# Patient Record
Sex: MALE | Race: WHITE | Employment: PART TIME | URBAN - METROPOLITAN AREA
[De-identification: names, ages, dates, MRNs, and addresses within clinical notes are randomized per-mention and may not be internally consistent; named-entity substitution may affect disease eponyms.]

---

## 2018-09-22 ENCOUNTER — APPOINTMENT (OUTPATIENT)
Dept: CT IMAGING | Age: 23
End: 2018-09-22

## 2018-09-22 ENCOUNTER — HOSPITAL ENCOUNTER (EMERGENCY)
Age: 23
Discharge: HOME | End: 2018-09-23
Attending: EMERGENCY MEDICINE

## 2018-09-22 DIAGNOSIS — S01.81XA FACIAL LACERATION, INITIAL ENCOUNTER: Primary | ICD-10-CM

## 2018-09-22 DIAGNOSIS — H11.31 CONJUNCTIVAL HEMORRHAGE OF RIGHT EYE: ICD-10-CM

## 2018-09-22 DIAGNOSIS — S09.90XA INJURY OF HEAD, INITIAL ENCOUNTER: ICD-10-CM

## 2018-09-22 PROCEDURE — 70486 CT MAXILLOFACIAL W/O DYE: CPT

## 2018-09-22 PROCEDURE — 70450 CT HEAD/BRAIN W/O DYE: CPT

## 2018-09-22 PROCEDURE — 72125 CT NECK SPINE W/O DYE: CPT

## 2018-09-22 PROCEDURE — 12013 RPR F/E/E/N/L/M 2.6-5.0 CM: CPT

## 2018-09-22 PROCEDURE — 99284 EMERGENCY DEPT VISIT MOD MDM: CPT

## 2018-09-22 RX ORDER — LIDOCAINE HYDROCHLORIDE AND EPINEPHRINE 10; 10 MG/ML; UG/ML
20 INJECTION, SOLUTION INFILTRATION; PERINEURAL ONCE
Status: DISCONTINUED | OUTPATIENT
Start: 2018-09-23 | End: 2018-09-23 | Stop reason: HOSPADM

## 2018-09-23 VITALS
HEART RATE: 92 BPM | DIASTOLIC BLOOD PRESSURE: 78 MMHG | RESPIRATION RATE: 20 BRPM | TEMPERATURE: 98.2 F | SYSTOLIC BLOOD PRESSURE: 124 MMHG | OXYGEN SATURATION: 97 %

## 2018-09-23 PROCEDURE — 6360000002 HC RX W HCPCS: Performed by: PHYSICIAN ASSISTANT

## 2018-09-23 PROCEDURE — 90715 TDAP VACCINE 7 YRS/> IM: CPT | Performed by: PHYSICIAN ASSISTANT

## 2018-09-23 PROCEDURE — 90471 IMMUNIZATION ADMIN: CPT | Performed by: PHYSICIAN ASSISTANT

## 2018-09-23 RX ADMIN — TETANUS TOXOID, REDUCED DIPHTHERIA TOXOID AND ACELLULAR PERTUSSIS VACCINE, ADSORBED 0.5 ML: 5; 2.5; 8; 8; 2.5 SUSPENSION INTRAMUSCULAR at 00:01

## 2018-09-23 NOTE — ED NOTES
Patient advised that someone needed to be with him tonight and that he should not drink any more ETOH. Patient instructed to follow up with PCP within the next week and to return to an Emergency Department if symptoms persist, change, or worsen.        Josie Ramesh RN  09/23/18 0157

## 2018-09-23 NOTE — ED PROVIDER NOTES
and alternatives (for applicable procedures below) described. Performed By: Ciara Vega PA-C. Laceration #: 1. Location: right lateral eyebrow  Length: 3.4 cm. The wound area was cleansed with povidone iodine and draped in a sterile fashion. Local Anesthesia:  Lidocaine 1% with epinephrine. The wound was explored with the following results:  no foreign body or tendon injury seen. Debridement: None. Undermining: None. Wound Margins Revised: None. Flaps Aligned: no. The wound was closed with 5-0 Ethilon using interrupted sutures. Dressing:  bacitracin and a bandage was placed. Total number suture:  7. There were no additional lacerations requiring repair. MDM:   Patient presents to the emergency department for head injury and facial laceration. CT of the head, facial bones, and cervical spine were unremarkable for acute abnormality with the exception of soft tissue swelling. Although the patient was intoxicated, he was clinically sober and able to tell me his name, where he was, and even the month and day. He was able to walk in the emergency department without assistance and without difficulty. Patient's laceration was approximated using sutures. Wound care discussed. Signs of infection explained. Specific conditions for emergent return have been discussed and the patient verbalized understanding to return immediately for new or worsening symptoms. Counseling: The emergency provider has spoken with the patient and discussed todays results, in addition to providing specific details for the plan of care and counseling regarding the diagnosis and prognosis. Questions are answered at this time and they are agreeable with the plan. Assessment      1. Facial laceration, initial encounter    2. Injury of head, initial encounter    3.  Conjunctival hemorrhage of right eye       Plan   Discharge to home  Patient condition is good    New Medications     New Prescriptions    No medications on file     Electronically signed by Severiano Dose, [place]   DD: 9/23/18  **This report was transcribed using voice recognition software. Every effort was made to ensure accuracy; however, inadvertent computerized transcription errors may be present. END OF ED PROVIDER NOTE        Severiano Dose, PA-C  09/23/18 0140       Severiano Dose, PA-C  09/23/18 0140      ATTENDING PROVIDER ATTESTATION:     Supervising Physician, on-site, available for consultation, non-participatory in the evaluation or care of this patient.          Karen Novoa DO  09/23/18 1949

## 2018-09-23 NOTE — ED NOTES
Patient made aware that he has to find a ride home before he can be discharged. He is making phone calls at this time.      Josie Ramesh RN  09/23/18 0043